# Patient Record
Sex: FEMALE | Race: WHITE | NOT HISPANIC OR LATINO | Employment: UNEMPLOYED | URBAN - METROPOLITAN AREA
[De-identification: names, ages, dates, MRNs, and addresses within clinical notes are randomized per-mention and may not be internally consistent; named-entity substitution may affect disease eponyms.]

---

## 2019-10-30 ENCOUNTER — HOSPITAL ENCOUNTER (INPATIENT)
Facility: HOSPITAL | Age: 44
LOS: 2 days | Discharge: HOME | DRG: 340 | End: 2019-11-02
Attending: EMERGENCY MEDICINE | Admitting: SURGERY
Payer: COMMERCIAL

## 2019-10-30 ENCOUNTER — APPOINTMENT (EMERGENCY)
Dept: CT IMAGING | Facility: HOSPITAL | Age: 44
DRG: 340 | End: 2019-10-30
Payer: COMMERCIAL

## 2019-10-30 DIAGNOSIS — K35.80 ACUTE APPENDICITIS: Primary | ICD-10-CM

## 2019-10-30 LAB
ALBUMIN SERPL BCP-MCNC: 4 G/DL (ref 3.5–5)
ALP SERPL-CCNC: 76 U/L (ref 46–116)
ALT SERPL W P-5'-P-CCNC: 25 U/L (ref 12–78)
ANION GAP SERPL CALCULATED.3IONS-SCNC: 13 MMOL/L (ref 4–13)
AST SERPL W P-5'-P-CCNC: 24 U/L (ref 5–45)
BASOPHILS # BLD AUTO: 0.04 THOUSANDS/ΜL (ref 0–0.1)
BASOPHILS NFR BLD AUTO: 0 % (ref 0–1)
BILIRUB SERPL-MCNC: 1.2 MG/DL (ref 0.2–1)
BUN SERPL-MCNC: 16 MG/DL (ref 5–25)
CALCIUM SERPL-MCNC: 8.9 MG/DL (ref 8.3–10.1)
CHLORIDE SERPL-SCNC: 101 MMOL/L (ref 100–108)
CO2 SERPL-SCNC: 21 MMOL/L (ref 21–32)
CREAT SERPL-MCNC: 0.86 MG/DL (ref 0.6–1.3)
EOSINOPHIL # BLD AUTO: 0.04 THOUSAND/ΜL (ref 0–0.61)
EOSINOPHIL NFR BLD AUTO: 0 % (ref 0–6)
ERYTHROCYTE [DISTWIDTH] IN BLOOD BY AUTOMATED COUNT: 12.4 % (ref 11.6–15.1)
GFR SERPL CREATININE-BSD FRML MDRD: 83 ML/MIN/1.73SQ M
GLUCOSE SERPL-MCNC: 121 MG/DL (ref 65–140)
HCG SERPL QL: NEGATIVE
HCT VFR BLD AUTO: 41.5 % (ref 34.8–46.1)
HGB BLD-MCNC: 14 G/DL (ref 11.5–15.4)
IMM GRANULOCYTES # BLD AUTO: 0.06 THOUSAND/UL (ref 0–0.2)
IMM GRANULOCYTES NFR BLD AUTO: 0 % (ref 0–2)
LIPASE SERPL-CCNC: 76 U/L (ref 73–393)
LYMPHOCYTES # BLD AUTO: 1.89 THOUSANDS/ΜL (ref 0.6–4.47)
LYMPHOCYTES NFR BLD AUTO: 12 % (ref 14–44)
MCH RBC QN AUTO: 30.8 PG (ref 26.8–34.3)
MCHC RBC AUTO-ENTMCNC: 33.7 G/DL (ref 31.4–37.4)
MCV RBC AUTO: 91 FL (ref 82–98)
MONOCYTES # BLD AUTO: 1 THOUSAND/ΜL (ref 0.17–1.22)
MONOCYTES NFR BLD AUTO: 6 % (ref 4–12)
NEUTROPHILS # BLD AUTO: 12.77 THOUSANDS/ΜL (ref 1.85–7.62)
NEUTS SEG NFR BLD AUTO: 82 % (ref 43–75)
NRBC BLD AUTO-RTO: 0 /100 WBCS
PLATELET # BLD AUTO: 274 THOUSANDS/UL (ref 149–390)
PMV BLD AUTO: 10.8 FL (ref 8.9–12.7)
POTASSIUM SERPL-SCNC: 4.1 MMOL/L (ref 3.5–5.3)
PROT SERPL-MCNC: 8.1 G/DL (ref 6.4–8.2)
RBC # BLD AUTO: 4.54 MILLION/UL (ref 3.81–5.12)
SODIUM SERPL-SCNC: 135 MMOL/L (ref 136–145)
WBC # BLD AUTO: 15.8 THOUSAND/UL (ref 4.31–10.16)

## 2019-10-30 PROCEDURE — 99285 EMERGENCY DEPT VISIT HI MDM: CPT

## 2019-10-30 PROCEDURE — 85025 COMPLETE CBC W/AUTO DIFF WBC: CPT | Performed by: EMERGENCY MEDICINE

## 2019-10-30 PROCEDURE — 96361 HYDRATE IV INFUSION ADD-ON: CPT

## 2019-10-30 PROCEDURE — 99285 EMERGENCY DEPT VISIT HI MDM: CPT | Performed by: EMERGENCY MEDICINE

## 2019-10-30 PROCEDURE — 0W9G40Z DRAINAGE OF PERITONEAL CAVITY WITH DRAINAGE DEVICE, PERCUTANEOUS ENDOSCOPIC APPROACH: ICD-10-PCS | Performed by: SURGERY

## 2019-10-30 PROCEDURE — 36415 COLL VENOUS BLD VENIPUNCTURE: CPT | Performed by: EMERGENCY MEDICINE

## 2019-10-30 PROCEDURE — 74177 CT ABD & PELVIS W/CONTRAST: CPT

## 2019-10-30 PROCEDURE — 83690 ASSAY OF LIPASE: CPT | Performed by: EMERGENCY MEDICINE

## 2019-10-30 PROCEDURE — 80053 COMPREHEN METABOLIC PANEL: CPT | Performed by: EMERGENCY MEDICINE

## 2019-10-30 PROCEDURE — 96375 TX/PRO/DX INJ NEW DRUG ADDON: CPT

## 2019-10-30 PROCEDURE — 0DTJ4ZZ RESECTION OF APPENDIX, PERCUTANEOUS ENDOSCOPIC APPROACH: ICD-10-PCS | Performed by: SURGERY

## 2019-10-30 PROCEDURE — 84703 CHORIONIC GONADOTROPIN ASSAY: CPT | Performed by: EMERGENCY MEDICINE

## 2019-10-30 RX ORDER — FENTANYL CITRATE 50 UG/ML
75 INJECTION, SOLUTION INTRAMUSCULAR; INTRAVENOUS ONCE
Status: COMPLETED | OUTPATIENT
Start: 2019-10-30 | End: 2019-10-30

## 2019-10-30 RX ORDER — KETOROLAC TROMETHAMINE 30 MG/ML
15 INJECTION, SOLUTION INTRAMUSCULAR; INTRAVENOUS ONCE
Status: COMPLETED | OUTPATIENT
Start: 2019-10-30 | End: 2019-10-30

## 2019-10-30 RX ORDER — ONDANSETRON 2 MG/ML
4 INJECTION INTRAMUSCULAR; INTRAVENOUS ONCE
Status: COMPLETED | OUTPATIENT
Start: 2019-10-30 | End: 2019-10-30

## 2019-10-30 RX ADMIN — FENTANYL CITRATE 75 MCG: 50 INJECTION, SOLUTION INTRAMUSCULAR; INTRAVENOUS at 22:02

## 2019-10-30 RX ADMIN — IOHEXOL 100 ML: 350 INJECTION, SOLUTION INTRAVENOUS at 23:10

## 2019-10-30 RX ADMIN — ONDANSETRON 4 MG: 2 INJECTION INTRAMUSCULAR; INTRAVENOUS at 22:02

## 2019-10-30 RX ADMIN — SODIUM CHLORIDE 1000 ML: 0.9 INJECTION, SOLUTION INTRAVENOUS at 22:03

## 2019-10-30 RX ADMIN — KETOROLAC TROMETHAMINE 15 MG: 30 INJECTION, SOLUTION INTRAMUSCULAR at 22:54

## 2019-10-31 ENCOUNTER — ANESTHESIA EVENT (EMERGENCY)
Dept: PERIOP | Facility: HOSPITAL | Age: 44
DRG: 340 | End: 2019-10-31
Payer: COMMERCIAL

## 2019-10-31 ENCOUNTER — ANESTHESIA (EMERGENCY)
Dept: PERIOP | Facility: HOSPITAL | Age: 44
DRG: 340 | End: 2019-10-31
Payer: COMMERCIAL

## 2019-10-31 PROBLEM — K35.80 ACUTE APPENDICITIS: Status: ACTIVE | Noted: 2019-10-30

## 2019-10-31 LAB
BILIRUB UR QL STRIP: NEGATIVE
CLARITY UR: CLEAR
COLOR UR: YELLOW
GLUCOSE UR STRIP-MCNC: NEGATIVE MG/DL
HGB UR QL STRIP.AUTO: NEGATIVE
KETONES UR STRIP-MCNC: ABNORMAL MG/DL
LEUKOCYTE ESTERASE UR QL STRIP: NEGATIVE
NITRITE UR QL STRIP: NEGATIVE
PH UR STRIP.AUTO: 7 [PH] (ref 4.5–8)
PROT UR STRIP-MCNC: NEGATIVE MG/DL
SP GR UR STRIP.AUTO: 1.01 (ref 1–1.03)
UROBILINOGEN UR QL STRIP.AUTO: 0.2 E.U./DL

## 2019-10-31 PROCEDURE — 96365 THER/PROPH/DIAG IV INF INIT: CPT

## 2019-10-31 PROCEDURE — 96375 TX/PRO/DX INJ NEW DRUG ADDON: CPT

## 2019-10-31 PROCEDURE — 96376 TX/PRO/DX INJ SAME DRUG ADON: CPT

## 2019-10-31 PROCEDURE — 88304 TISSUE EXAM BY PATHOLOGIST: CPT | Performed by: PATHOLOGY

## 2019-10-31 PROCEDURE — 96361 HYDRATE IV INFUSION ADD-ON: CPT

## 2019-10-31 PROCEDURE — 96372 THER/PROPH/DIAG INJ SC/IM: CPT

## 2019-10-31 PROCEDURE — 96368 THER/DIAG CONCURRENT INF: CPT

## 2019-10-31 PROCEDURE — 81003 URINALYSIS AUTO W/O SCOPE: CPT

## 2019-10-31 PROCEDURE — 44970 LAPAROSCOPY APPENDECTOMY: CPT | Performed by: SURGERY

## 2019-10-31 PROCEDURE — 96366 THER/PROPH/DIAG IV INF ADDON: CPT

## 2019-10-31 RX ORDER — ALBUTEROL SULFATE 90 UG/1
2 AEROSOL, METERED RESPIRATORY (INHALATION) AS NEEDED
COMMUNITY

## 2019-10-31 RX ORDER — SODIUM CHLORIDE 9 MG/ML
125 INJECTION, SOLUTION INTRAVENOUS CONTINUOUS
Status: DISCONTINUED | OUTPATIENT
Start: 2019-10-31 | End: 2019-11-01

## 2019-10-31 RX ORDER — HYDROMORPHONE HCL/PF 1 MG/ML
1 SYRINGE (ML) INJECTION
Status: DISCONTINUED | OUTPATIENT
Start: 2019-10-31 | End: 2019-11-01

## 2019-10-31 RX ORDER — SODIUM CHLORIDE 9 MG/ML
INJECTION, SOLUTION INTRAVENOUS AS NEEDED
Status: DISCONTINUED | OUTPATIENT
Start: 2019-10-31 | End: 2019-10-31 | Stop reason: HOSPADM

## 2019-10-31 RX ORDER — PROPOFOL 10 MG/ML
INJECTION, EMULSION INTRAVENOUS AS NEEDED
Status: DISCONTINUED | OUTPATIENT
Start: 2019-10-31 | End: 2019-10-31 | Stop reason: SURG

## 2019-10-31 RX ORDER — DEXAMETHASONE SODIUM PHOSPHATE 4 MG/ML
INJECTION, SOLUTION INTRA-ARTICULAR; INTRALESIONAL; INTRAMUSCULAR; INTRAVENOUS; SOFT TISSUE AS NEEDED
Status: DISCONTINUED | OUTPATIENT
Start: 2019-10-31 | End: 2019-10-31 | Stop reason: SURG

## 2019-10-31 RX ORDER — FENTANYL CITRATE/PF 50 MCG/ML
25 SYRINGE (ML) INJECTION
Status: DISCONTINUED | OUTPATIENT
Start: 2019-10-31 | End: 2019-10-31 | Stop reason: HOSPADM

## 2019-10-31 RX ORDER — PROMETHAZINE HYDROCHLORIDE 25 MG/ML
25 INJECTION, SOLUTION INTRAMUSCULAR; INTRAVENOUS ONCE AS NEEDED
Status: DISCONTINUED | OUTPATIENT
Start: 2019-10-31 | End: 2019-10-31 | Stop reason: HOSPADM

## 2019-10-31 RX ORDER — HYDROMORPHONE HCL/PF 1 MG/ML
0.4 SYRINGE (ML) INJECTION
Status: DISCONTINUED | OUTPATIENT
Start: 2019-10-31 | End: 2019-10-31 | Stop reason: HOSPADM

## 2019-10-31 RX ORDER — ONDANSETRON 2 MG/ML
4 INJECTION INTRAMUSCULAR; INTRAVENOUS ONCE AS NEEDED
Status: DISCONTINUED | OUTPATIENT
Start: 2019-10-31 | End: 2019-10-31 | Stop reason: HOSPADM

## 2019-10-31 RX ORDER — FENTANYL CITRATE 50 UG/ML
INJECTION, SOLUTION INTRAMUSCULAR; INTRAVENOUS AS NEEDED
Status: DISCONTINUED | OUTPATIENT
Start: 2019-10-31 | End: 2019-10-31 | Stop reason: SURG

## 2019-10-31 RX ORDER — ACETAMINOPHEN 325 MG/1
650 TABLET ORAL EVERY 6 HOURS PRN
Status: DISCONTINUED | OUTPATIENT
Start: 2019-10-31 | End: 2019-11-02 | Stop reason: HOSPADM

## 2019-10-31 RX ORDER — CEFAZOLIN SODIUM 2 G/50ML
2000 SOLUTION INTRAVENOUS EVERY 8 HOURS
Status: DISCONTINUED | OUTPATIENT
Start: 2019-10-31 | End: 2019-11-02 | Stop reason: HOSPADM

## 2019-10-31 RX ORDER — KETOROLAC TROMETHAMINE 30 MG/ML
15 INJECTION, SOLUTION INTRAMUSCULAR; INTRAVENOUS EVERY 6 HOURS PRN
Status: DISPENSED | OUTPATIENT
Start: 2019-10-31 | End: 2019-11-01

## 2019-10-31 RX ORDER — HYDROMORPHONE HCL/PF 1 MG/ML
1 SYRINGE (ML) INJECTION ONCE
Status: COMPLETED | OUTPATIENT
Start: 2019-10-31 | End: 2019-10-31

## 2019-10-31 RX ORDER — BUPIVACAINE HYDROCHLORIDE 2.5 MG/ML
INJECTION, SOLUTION INFILTRATION; PERINEURAL AS NEEDED
Status: DISCONTINUED | OUTPATIENT
Start: 2019-10-31 | End: 2019-10-31 | Stop reason: HOSPADM

## 2019-10-31 RX ORDER — OXYCODONE HYDROCHLORIDE 5 MG/1
5 TABLET ORAL EVERY 4 HOURS PRN
Status: DISCONTINUED | OUTPATIENT
Start: 2019-10-31 | End: 2019-11-01

## 2019-10-31 RX ORDER — ONDANSETRON 2 MG/ML
4 INJECTION INTRAMUSCULAR; INTRAVENOUS EVERY 6 HOURS PRN
Status: DISCONTINUED | OUTPATIENT
Start: 2019-10-31 | End: 2019-11-02 | Stop reason: HOSPADM

## 2019-10-31 RX ORDER — OXYCODONE HYDROCHLORIDE 10 MG/1
10 TABLET ORAL EVERY 4 HOURS PRN
Status: DISCONTINUED | OUTPATIENT
Start: 2019-10-31 | End: 2019-11-01

## 2019-10-31 RX ORDER — MONTELUKAST SODIUM 10 MG/1
10 TABLET ORAL
COMMUNITY

## 2019-10-31 RX ORDER — MAGNESIUM HYDROXIDE 1200 MG/15ML
LIQUID ORAL AS NEEDED
Status: DISCONTINUED | OUTPATIENT
Start: 2019-10-31 | End: 2019-10-31 | Stop reason: HOSPADM

## 2019-10-31 RX ORDER — ROCURONIUM BROMIDE 10 MG/ML
INJECTION, SOLUTION INTRAVENOUS AS NEEDED
Status: DISCONTINUED | OUTPATIENT
Start: 2019-10-31 | End: 2019-10-31 | Stop reason: SURG

## 2019-10-31 RX ORDER — METOCLOPRAMIDE HYDROCHLORIDE 5 MG/ML
10 INJECTION INTRAMUSCULAR; INTRAVENOUS ONCE AS NEEDED
Status: DISCONTINUED | OUTPATIENT
Start: 2019-10-31 | End: 2019-10-31 | Stop reason: HOSPADM

## 2019-10-31 RX ADMIN — OXYCODONE HYDROCHLORIDE 10 MG: 10 TABLET ORAL at 18:28

## 2019-10-31 RX ADMIN — OXYCODONE HYDROCHLORIDE 10 MG: 10 TABLET ORAL at 23:36

## 2019-10-31 RX ADMIN — FENTANYL CITRATE 50 MCG: 50 INJECTION INTRAMUSCULAR; INTRAVENOUS at 13:22

## 2019-10-31 RX ADMIN — HYDROMORPHONE HYDROCHLORIDE 1 MG: 1 INJECTION, SOLUTION INTRAMUSCULAR; INTRAVENOUS; SUBCUTANEOUS at 07:02

## 2019-10-31 RX ADMIN — CEFAZOLIN SODIUM 2000 MG: 2 SOLUTION INTRAVENOUS at 01:37

## 2019-10-31 RX ADMIN — METRONIDAZOLE 500 MG: 500 INJECTION, SOLUTION INTRAVENOUS at 18:42

## 2019-10-31 RX ADMIN — HYDROMORPHONE HYDROCHLORIDE 1 MG: 1 INJECTION, SOLUTION INTRAMUSCULAR; INTRAVENOUS; SUBCUTANEOUS at 01:40

## 2019-10-31 RX ADMIN — DEXAMETHASONE SODIUM PHOSPHATE 4 MG: 4 INJECTION, SOLUTION INTRAMUSCULAR; INTRAVENOUS at 13:37

## 2019-10-31 RX ADMIN — HYDROMORPHONE HYDROCHLORIDE 0.5 MG: 1 INJECTION, SOLUTION INTRAMUSCULAR; INTRAVENOUS; SUBCUTANEOUS at 13:48

## 2019-10-31 RX ADMIN — HYDROMORPHONE HYDROCHLORIDE 0.5 MG: 1 INJECTION, SOLUTION INTRAMUSCULAR; INTRAVENOUS; SUBCUTANEOUS at 13:43

## 2019-10-31 RX ADMIN — CEFAZOLIN SODIUM 2000 MG: 2 SOLUTION INTRAVENOUS at 20:28

## 2019-10-31 RX ADMIN — HYDROMORPHONE HYDROCHLORIDE 1 MG: 1 INJECTION, SOLUTION INTRAMUSCULAR; INTRAVENOUS; SUBCUTANEOUS at 18:40

## 2019-10-31 RX ADMIN — CEFAZOLIN SODIUM 2000 MG: 2 SOLUTION INTRAVENOUS at 09:34

## 2019-10-31 RX ADMIN — HYDROMORPHONE HYDROCHLORIDE 1 MG: 1 INJECTION, SOLUTION INTRAMUSCULAR; INTRAVENOUS; SUBCUTANEOUS at 09:40

## 2019-10-31 RX ADMIN — METRONIDAZOLE 500 MG: 500 INJECTION, SOLUTION INTRAVENOUS at 01:37

## 2019-10-31 RX ADMIN — PHENYLEPHRINE HYDROCHLORIDE 100 MCG: 10 INJECTION INTRAVENOUS at 13:29

## 2019-10-31 RX ADMIN — METRONIDAZOLE 500 MG: 500 INJECTION, SOLUTION INTRAVENOUS at 10:35

## 2019-10-31 RX ADMIN — FENTANYL CITRATE 25 MCG: 50 INJECTION, SOLUTION INTRAMUSCULAR; INTRAVENOUS at 15:31

## 2019-10-31 RX ADMIN — SODIUM CHLORIDE 125 ML/HR: 0.9 INJECTION, SOLUTION INTRAVENOUS at 03:35

## 2019-10-31 RX ADMIN — FENTANYL CITRATE 25 MCG: 50 INJECTION, SOLUTION INTRAMUSCULAR; INTRAVENOUS at 15:19

## 2019-10-31 RX ADMIN — SODIUM CHLORIDE 125 ML/HR: 0.9 INJECTION, SOLUTION INTRAVENOUS at 10:42

## 2019-10-31 RX ADMIN — FENTANYL CITRATE 150 MCG: 50 INJECTION INTRAMUSCULAR; INTRAVENOUS at 13:29

## 2019-10-31 RX ADMIN — ONDANSETRON 4 MG: 2 INJECTION INTRAMUSCULAR; INTRAVENOUS at 14:09

## 2019-10-31 RX ADMIN — CEFAZOLIN SODIUM 2000 MG: 2 SOLUTION INTRAVENOUS at 13:44

## 2019-10-31 RX ADMIN — PROPOFOL 200 MG: 10 INJECTION, EMULSION INTRAVENOUS at 13:29

## 2019-10-31 RX ADMIN — ENOXAPARIN SODIUM 40 MG: 40 INJECTION SUBCUTANEOUS at 09:30

## 2019-10-31 RX ADMIN — ROCURONIUM BROMIDE 50 MG: 10 SOLUTION INTRAVENOUS at 13:29

## 2019-10-31 NOTE — ANESTHESIA POSTPROCEDURE EVALUATION
Post-Op Assessment Note    CV Status:  Stable  Pain Score: 0    Pain management: adequate     Mental Status:  Alert   Hydration Status:  Stable   PONV Controlled:  None   Airway Patency:  Patent   Post Op Vitals Reviewed: Yes      Staff: Anesthesiologist           BP   117/58   Temp  99   Pulse 100   Resp   11   SpO2   100

## 2019-10-31 NOTE — DISCHARGE INSTRUCTIONS
Diet and activity as tolerated  May shower  May drive when not taking pain medications  Please call [phone] for a follow-up office visit for 2 weeks  [address] [facility], [address], [place], [address] [facility], [address]    Between [facility] and [facility]     Please consider milk a magnesia as needed in order to help prevent constipation

## 2019-10-31 NOTE — ED NOTES
Patient aware urine sample is needed, Patient does not have to go at this time       April Tressa Schwartz RN  10/30/19 0661

## 2019-10-31 NOTE — ANESTHESIA PREPROCEDURE EVALUATION
Review of Systems/Medical History  Patient summary reviewed  Chart reviewed      Cardiovascular  Exercise tolerance (METS): >4,     Pulmonary  Asthma ,        GI/Hepatic      Comment: Appendicitis     Negative  ROS        Endo/Other  Negative endo/other ROS      GYN  Negative gynecology ROS          Hematology  Negative hematology ROS      Musculoskeletal    Comment: Left sided alejandrina facial spasms for which she receives botox      Neurology  Negative neurology ROS      Psychology   Negative psychology ROS              Physical Exam    Airway    Mallampati score: III  TM Distance: >3 FB  Neck ROM: full     Dental   No notable dental hx     Cardiovascular  Cardiovascular exam normal    Pulmonary  Pulmonary exam normal     Other Findings        Anesthesia Plan  ASA Score- 2     Anesthesia Type- general with ASA Monitors  Additional Monitors:   Airway Plan: ETT  Plan Factors-    Induction- intravenous  Postoperative Plan-     Informed Consent- Anesthetic plan and risks discussed with patient

## 2019-10-31 NOTE — H&P
H&P Exam - General Surgery   Louvella Blizzard 38 y o  female MRN: [id number]  Unit/Bed#: ED 16 Encounter: [id number]    Assessment/Plan     Assessment/Plan:  38 y o  yo female with acute appendicitis    MAINOR  [email or web address]  Ancef/flagyl  To OR today for laparoscopic appendectomy  DVT ppx    History of Present Illness     HPI:  Louvella Blizzard is a 38 y o  female who presents with acute onset abdominal pain starting 1 day prior  Patient denies fevers and chills at home  Pain is located in the right lower quadrant and is nonradiating  Patient reports that pain was relieved when she arrived to the emergency department, but is starting to bother her now  She denies any past surgeries  Review of Systems   Constitutional: Negative  HENT: Negative  Eyes: Negative  Respiratory: Negative  Cardiovascular: Negative  Gastrointestinal: Positive for abdominal pain  Negative for nausea and vomiting  Endocrine: Negative  Genitourinary: Negative  Musculoskeletal: Negative  Skin: Negative  Allergic/Immunologic: Negative  Neurological: Negative  Hematological: Negative  Psychiatric/Behavioral: Negative  Historical Information   Past Medical History:   Diagnosis Date    Asthma      History reviewed  No pertinent surgical history  Social History   Social History     Substance and Sexual Activity   Alcohol Use Yes    Comment: Social     Social History     Substance and Sexual Activity   Drug Use Never     Social History     Tobacco Use   Smoking Status Never Smoker   Smokeless Tobacco Never Used     Family History: History reviewed  No pertinent family history  Meds/Allergies   PTA meds:   Prior to Admission Medications   Prescriptions Last Dose Informant Patient Reported? Taking?    Beclomethasone Dipropionate (QVAR IN) 10/30/2019 at Unknown time  Yes Yes   Sig: Inhale 2 (two) times a day   albuterol (PROVENTIL HFA,VENTOLIN HFA) 90 mcg/act inhaler Past Week at Unknown time  Yes Yes Sig: Inhale 2 puffs as needed for wheezing   montelukast (SINGULAIR) 10 mg tablet Past Week at Unknown time  Yes Yes   Sig: Take 10 mg by mouth daily at bedtime      Facility-Administered Medications: None     No Known Allergies    Objective   First Vitals:   Blood Pressure: 120/82 (10/30/19 2200)  Pulse: 85 (10/30/19 2143)  Temperature: 97 7 °F (36 5 °C) (10/30/19 2143)  Temp Source: Oral (10/30/19 2143)  Respirations: 20 (10/30/19 2143)  Height: 5' 6" (167 6 cm) (10/31/19 0010)  Weight - Scale: 81 1 kg (178 lb 12 7 oz) (10/30/19 2143)  SpO2: 100 % (10/30/19 2200)    Current Vitals:   Blood Pressure: 106/60 (10/31/19 0100)  Pulse: (!) 114 (10/31/19 0100)  Temperature: 97 7 °F (36 5 °C) (10/30/19 2143)  Temp Source: Oral (10/30/19 2143)  Respirations: 16 (10/31/19 0100)  Height: 5' 6" (167 6 cm) (10/31/19 0010)  Weight - Scale: 81 1 kg (178 lb 12 7 oz) (10/30/19 2143)  SpO2: 97 % (10/31/19 0100)      Intake/Output Summary (Last 24 hours) at 10/31/2019 0656  Last data filed at 10/31/2019 0340  Gross per 24 hour   Intake 1150 ml   Output 100 ml   Net 1050 ml       Invasive Devices     Peripheral Intravenous Line            Peripheral IV 10/30/19 Right Antecubital less than 1 day                Physical Exam   Constitutional: She is oriented to person, place, and time  She appears well-developed and well-nourished  HENT:   Head: Normocephalic and atraumatic  Eyes: Pupils are equal, round, and reactive to light  Neck: Normal range of motion  Cardiovascular: Normal rate and regular rhythm  Pulmonary/Chest: Effort normal    Abdominal: Soft  Musculoskeletal: Normal range of motion  Neurological: She is alert and oriented to person, place, and time  Skin: Skin is warm and dry  Psychiatric: She has a normal mood and affect   Her behavior is normal        Lab Results:   CBC:   Lab Results   Component Value Date    WBC 15 80 (H) 10/30/2019    HGB 14 0 10/30/2019    HCT 41 5 10/30/2019    MCV 91 10/30/2019  10/30/2019    MCH 30 8 10/30/2019    MCHC 33 7 10/30/2019    RDW 12 4 10/30/2019    MPV 10 8 10/30/2019    NRBC 0 10/30/2019   , CMP:   Lab Results   Component Value Date    SODIUM 135 (L) 10/30/2019    K 4 1 10/30/2019     10/30/2019    CO2 21 10/30/2019    BUN 16 10/30/2019    CREATININE 0 86 10/30/2019    CALCIUM 8 9 10/30/2019    AST 24 10/30/2019    ALT 25 10/30/2019    ALKPHOS 76 10/30/2019    EGFR 83 10/30/2019   , Coagulation: No results found for: PT, INR, APTT  Imaging: I have personally reviewed pertinent reports  and I have personally reviewed pertinent films in PACS  EKG, Pathology, and Other Studies: I have personally reviewed pertinent reports  and I have personally reviewed pertinent films in PACS    Code Status: Level 1 - Full Code  Advance Directive and Living Will:      Power of :    POLST:      Counseling / Coordination of Care  Total floor / unit time spent today 20 minutes  Greater than 50% of total time was spent with the patient and / or family counseling and / or coordination of care

## 2019-10-31 NOTE — OP NOTE
OPERATIVE REPORT  PATIENT NAME: Darius Kim    :  1975  MRN: [id number]  Pt Location: AN OR ROOM 02    SURGERY DATE: 10/31/2019    Surgeon(s) and Role:     * Brendan Cuba MD - Primary     * Chay Desouza - Assisting    Preop Diagnosis:  Acute appendicitis [K35 80]    Post-Op Diagnosis Codes:     * Acute appendicitis [K35 80] with perforation and abscess    Procedure(s) (LRB):  APPENDECTOMY LAPAROSCOPIC (N/A), drainage of abscess, placement of drain    Specimen(s):  ID Type Source Tests Collected by Time Destination   1 : CC PCP Carloyn Nyhan, DO Tissue Appendix TISSUE EXAM Brendan Cuba MD 10/31/2019 1403        Estimated Blood Loss:   Minimal    Drains:  Closed/Suction Drain Other (Comment) RLQ Bulb 10 Fr  (Active)   Site Description Unable to view 10/31/2019  2:39 PM   Dressing Status Intact;Dry 10/31/2019  2:39 PM   Drainage Appearance Bloody 10/31/2019  2:39 PM   Status To bulb suction 10/31/2019  2:39 PM   Number of days: 0       Anesthesia Type:   General    Operative Indications:  Acute appendicitis [K35 80]      Operative Findings:  Independent, nonsmoker, ASA 1, wound class 4, BMI 29, weight 180, height 66    Cardiovascular  Exercise tolerance (METS): >4,      Pulmonary  Asthma ,          GI/Hepatic        Comment: Appendicitis      Negative  ROS          Endo/Other  Negative endo/other ROS        GYN  Negative gynecology ROS             Hematology  Negative hematology ROS        Musculoskeletal     Comment: Left sided alejandrina facial spasms for which she receives botox       Neurology  Negative neurology ROS        Psychology   Negative psychology ROS          Complications:   None    Procedure and Technique  Patient was identified visually and via armband  Placed in the supine position  After general anesthesia the abdomen was prepped draped in a sterile fashion  0 25% Marcaine with epinephrine was utilized throughout the procedure  Incision was made at the umbilicus    This carried down through skin subcutaneous tissue  A direct vision a 12 mm trocar was then placed under direct patient  CO2 was then insufflated back pressure 15  Two additional midline ports were then placed after Marcaine instillation under direct vision  The there was separation noted in the right lower quadrant the abdomen  There was flexible particulate stool matter present the appendix was noted of a be aimed into the pelvis and flow being tube region  And breaking up adhesions of was noted to have free floating stool  Further adhesiolysis into the pelvis revealed an abscess behind the uterus  Separation was infection was aspirated white initially  Fecaliths were aspirated away by op sizing are aspirating instrumentation  The mesoappendix was then divided using Harmonic scalpel  This carried down level the appendiceal base  This was then divided then using Harmonic scalpel  Was placed Endo-Catch bag  At this point the abdomen and pelvis was then irrigated with the the additional irrigation  A JAYMIE drain was then placed here in the epigastric region port down to the right lower quadrant and pelvis  This was affixed to the skin  Fascia was then closed 0 Vicryl suture followed by 4 Monocryl suture and Dermabond  The patient tolerated this procedure well  Sponge instrument count correct x2       I was present for the entire procedure    Patient Disposition:  PACU     SIGNATURE: Lia Holly MD  DATE: October 31, 2019  TIME: 3:42 PM

## 2019-10-31 NOTE — ED PROVIDER NOTES
History  Chief Complaint   Patient presents with    Abdominal Pain Re-Eval     Pt here for evaluation of severe right lower abd pain that starting at 3am and progressively became worse throughout the day  +vomiting  History provided by:  Patient   used: No    67-year-old female with a history of asthma only presented for evaluation of worsening abdominal pain over the last 18 hours  States that it woke her from sleep around the periumbilical/epigastric region at 3:00 a m  And has persisted throughout the day, moving to the right lower quadrant and worsening in intensity  Currently it is severe, localized without radiation  Has associated nausea with some vomiting  No diarrhea, urinary complaints  LMP about 10/10  No abdominal surgeries  She appears very uncomfortable on exam and has significant tenderness in the right lower quadrant with guarding and some rebound  Acute appendicitis is likely  Plan labs, CT, pain control and re-evaluate  None       Past Medical History:   Diagnosis Date    Asthma        History reviewed  No pertinent surgical history  History reviewed  No pertinent family history  I have reviewed and agree with the history as documented  Social History     Tobacco Use    Smoking status: Never Smoker    Smokeless tobacco: Never Used   Substance Use Topics    Alcohol use: Yes     Comment: Social    Drug use: Never        Review of Systems   Constitutional: Positive for appetite change  Negative for activity change, fatigue and fever  Respiratory: Negative for chest tightness and shortness of breath  Gastrointestinal: Positive for abdominal pain, nausea and vomiting  Musculoskeletal: Negative for back pain and neck pain  Neurological: Negative for dizziness and weakness  All other systems reviewed and are negative  Physical Exam  Physical Exam   Constitutional: She appears well-developed and well-nourished  She appears distressed  HENT:   Head: Normocephalic  Mouth/Throat: Oropharynx is clear and moist    Cardiovascular: Normal rate and regular rhythm  Pulmonary/Chest: Effort normal  No respiratory distress  Abdominal: Soft  She exhibits no mass  Significant right lower quadrant tenderness with rebound, guarding  Musculoskeletal: Normal range of motion  She exhibits no edema  Skin: Skin is warm and dry  No rash noted  Psychiatric: She has a normal mood and affect  Her behavior is normal    Nursing note and vitals reviewed        Vital Signs  ED Triage Vitals   Temperature Pulse Respirations Blood Pressure SpO2   10/30/19 2143 10/30/19 2143 10/30/19 2143 10/30/19 2200 10/30/19 2200   97 7 °F (36 5 °C) 85 20 120/82 100 %      Temp Source Heart Rate Source Patient Position - Orthostatic VS BP Location FiO2 (%)   10/30/19 2143 10/30/19 2143 10/31/19 0010 10/30/19 2143 --   Oral Monitor Lying Right arm       Pain Score       10/30/19 2143       Worst Possible Pain           Vitals:    10/30/19 2143 10/30/19 2200 10/31/19 0010 10/31/19 0015   BP:  120/82 102/59 102/59   Pulse: 85 96 (!) 114 (!) 114   Patient Position - Orthostatic VS:   Lying          Visual Acuity      ED Medications  Medications   ondansetron (ZOFRAN) injection 4 mg (has no administration in time range)   enoxaparin (LOVENOX) subcutaneous injection 40 mg (has no administration in time range)   sodium chloride 0 9 % infusion (has no administration in time range)   metroNIDAZOLE (FLAGYL) IVPB (premix) 500 mg (has no administration in time range)   ceFAZolin (ANCEF) IVPB (premix) 2,000 mg (has no administration in time range)   acetaminophen (TYLENOL) tablet 650 mg (has no administration in time range)   HYDROmorphone (DILAUDID) injection 1 mg (has no administration in time range)   oxyCODONE (ROXICODONE) immediate release tablet 10 mg (has no administration in time range)   oxyCODONE (ROXICODONE) IR tablet 5 mg (has no administration in time range)   HYDROmorphone (DILAUDID) injection 1 mg (has no administration in time range)   sodium chloride 0 9 % bolus 1,000 mL (1,000 mL Intravenous New Bag 10/30/19 2203)   ondansetron (ZOFRAN) injection 4 mg (4 mg Intravenous Given 10/30/19 2202)   ketorolac (TORADOL) injection 15 mg (15 mg Intravenous Given 10/30/19 2254)   fentanyl citrate (PF) 100 MCG/2ML 75 mcg (75 mcg Intravenous Given 10/30/19 2202)   iohexol (OMNIPAQUE) 350 MG/ML injection (MULTI-DOSE) 100 mL (100 mL Intravenous Given 10/30/19 2310)       Diagnostic Studies  Results Reviewed     Procedure Component Value Units Date/Time    ED Urine Macroscopic [[id number]]  (Abnormal) Collected:  10/31/19 0030    Lab Status:  Final result Specimen:  Urine Updated:  10/31/19 0013     Color, UA Yellow     Clarity, UA Clear     pH, UA 7 0     Leukocytes, UA Negative     Nitrite, UA Negative     Protein, UA Negative mg/dl      Glucose, UA Negative mg/dl      Ketones, UA 15 (1+) mg/dl      Urobilinogen, UA 0 2 E U /dl      Bilirubin, UA Negative     Blood, UA Negative     Specific Gravity, UA 1 010    Narrative:       CLINITEK RESULT    hCG, qualitative pregnancy [[id number]]  (Normal) Collected:  10/30/19 2205    Lab Status:  Final result Specimen:  Blood from Arm, Right Updated:  10/30/19 2251     Preg, Serum Negative    Lipase [[id number]]  (Normal) Collected:  10/30/19 2205    Lab Status:  Final result Specimen:  Blood from Arm, Right Updated:  10/30/19 2231     Lipase 76 u/L     Comprehensive metabolic panel [[id number]]  (Abnormal) Collected:  10/30/19 2205    Lab Status:  Final result Specimen:  Blood from Arm, Right Updated:  10/30/19 2231     Sodium 135 mmol/L      Potassium 4 1 mmol/L      Chloride 101 mmol/L      CO2 21 mmol/L      ANION GAP 13 mmol/L      BUN 16 mg/dL      Creatinine 0 86 mg/dL      Glucose 121 mg/dL      Calcium 8 9 mg/dL      AST 24 U/L      ALT 25 U/L      Alkaline Phosphatase 76 U/L      Total Protein 8 1 g/dL      Albumin 4 0 g/dL      Total Bilirubin 1 20 mg/dL      eGFR 83 ml/min/1 73sq m     Narrative:       [place] guidelines for Chronic Kidney Disease (CKD):     Stage 1 with normal or high GFR (GFR > 90 mL/min/1 73 square meters)    Stage 2 Mild CKD (GFR = 60-89 mL/min/1 73 square meters)    Stage 3A Moderate CKD (GFR = 45-59 mL/min/1 73 square meters)    Stage 3B Moderate CKD (GFR = 30-44 mL/min/1 73 square meters)    Stage 4 Severe CKD (GFR = 15-29 mL/min/1 73 square meters)    Stage 5 End Stage CKD (GFR <15 mL/min/1 73 square meters)  Note: GFR calculation is accurate only with a steady state creatinine    CBC and differential [[id number]]  (Abnormal) Collected:  10/30/19 2205    Lab Status:  Final result Specimen:  Blood from Arm, Right Updated:  10/30/19 2213     WBC 15 80 Thousand/uL      RBC 4 54 Million/uL      Hemoglobin 14 0 g/dL      Hematocrit 41 5 %      MCV 91 fL      MCH 30 8 pg      MCHC 33 7 g/dL      RDW 12 4 %      MPV 10 8 fL      Platelets 662 Thousands/uL      nRBC 0 /100 WBCs      Neutrophils Relative 82 %      Immat GRANS % 0 %      Lymphocytes Relative 12 %      Monocytes Relative 6 %      Eosinophils Relative 0 %      Basophils Relative 0 %      Neutrophils Absolute 12 77 Thousands/µL      Immature Grans Absolute 0 06 Thousand/uL      Lymphocytes Absolute 1 89 Thousands/µL      Monocytes Absolute 1 00 Thousand/µL      Eosinophils Absolute 0 04 Thousand/µL      Basophils Absolute 0 04 Thousands/µL                  CT abdomen pelvis with contrast   Final Result by Jose L Douglas MD (10/31 0018)      Acute appendicitis, as described above  Please see discussion  Surgical consultation is recommended               I personally discussed this study with Sharonda Ramirez on 10/31/2019 at 12:15 AM                      Workstation performed: [id number]                    Procedures  Procedures       ED Course  ED Course as of Oct 31 0031   Wed Oct 30, 2019   2342 Patient still having pain but significantly improved since arrival   Awaiting CT read  MDM  Number of Diagnoses or Management Options  Acute appendicitis: new and requires workup  Diagnosis management comments: 60-year-old otherwise healthy female presented with worsening abdominal pain initially in the periumbilical region and moving to the right lower quadrant  Very tender on exam   CT consistent with acute appendicitis, possible rupture with free fluid in the pelvis  Surgical team was consulted  Patient admitted to surgical service for antibiotics, appendectomy  Patient stable at this time  Amount and/or Complexity of Data Reviewed  Clinical lab tests: ordered and reviewed  Tests in the radiology section of CPT®: reviewed and ordered  Discuss the patient with other providers: yes    Patient Progress  Patient progress: improved      Disposition  Final diagnoses:   Acute appendicitis     Time reflects when diagnosis was documented in both MDM as applicable and the Disposition within this note     Time User Action Codes Description Comment    10/31/2019 12:19 AM Jasvir Perez [K35 80] Acute appendicitis       ED Disposition     ED Disposition Condition Date/Time Comment    Admit Stable Thu Oct 31, 2019 12:26 AM Case was discussed with Frederick Troy and the patient's admission status was agreed to be Admission Status: observation status to the service of Dr Eric Torres  Follow-up Information    None         Patient's Medications    No medications on file     No discharge procedures on file      ED Provider  Electronically Signed by           Ekaterina Little MD  10/31/19 3002

## 2019-11-01 LAB
ANION GAP SERPL CALCULATED.3IONS-SCNC: 8 MMOL/L (ref 4–13)
BUN SERPL-MCNC: 14 MG/DL (ref 5–25)
CALCIUM SERPL-MCNC: 7.6 MG/DL (ref 8.3–10.1)
CHLORIDE SERPL-SCNC: 103 MMOL/L (ref 100–108)
CO2 SERPL-SCNC: 24 MMOL/L (ref 21–32)
CREAT SERPL-MCNC: 0.85 MG/DL (ref 0.6–1.3)
GFR SERPL CREATININE-BSD FRML MDRD: 84 ML/MIN/1.73SQ M
GLUCOSE SERPL-MCNC: 133 MG/DL (ref 65–140)
MAGNESIUM SERPL-MCNC: 1.8 MG/DL (ref 1.6–2.6)
POTASSIUM SERPL-SCNC: 3.2 MMOL/L (ref 3.5–5.3)
SODIUM SERPL-SCNC: 135 MMOL/L (ref 136–145)

## 2019-11-01 PROCEDURE — 99232 SBSQ HOSP IP/OBS MODERATE 35: CPT | Performed by: NURSE PRACTITIONER

## 2019-11-01 PROCEDURE — 83735 ASSAY OF MAGNESIUM: CPT | Performed by: SURGERY

## 2019-11-01 PROCEDURE — 80048 BASIC METABOLIC PNL TOTAL CA: CPT | Performed by: SURGERY

## 2019-11-01 RX ORDER — OXYCODONE HYDROCHLORIDE AND ACETAMINOPHEN 5; 325 MG/1; MG/1
1 TABLET ORAL EVERY 4 HOURS PRN
Qty: 20 TABLET | Refills: 0 | Status: CANCELLED | OUTPATIENT
Start: 2019-11-01 | End: 2019-11-11

## 2019-11-01 RX ORDER — HYDROMORPHONE HCL/PF 1 MG/ML
0.2 SYRINGE (ML) INJECTION
Status: DISCONTINUED | OUTPATIENT
Start: 2019-11-01 | End: 2019-11-02 | Stop reason: HOSPADM

## 2019-11-01 RX ORDER — OXYCODONE HYDROCHLORIDE 5 MG/1
5 TABLET ORAL EVERY 4 HOURS PRN
Status: DISCONTINUED | OUTPATIENT
Start: 2019-11-01 | End: 2019-11-02 | Stop reason: HOSPADM

## 2019-11-01 RX ORDER — OXYCODONE HYDROCHLORIDE 10 MG/1
10 TABLET ORAL EVERY 4 HOURS PRN
Status: DISCONTINUED | OUTPATIENT
Start: 2019-11-01 | End: 2019-11-02 | Stop reason: HOSPADM

## 2019-11-01 RX ORDER — POTASSIUM CHLORIDE, DEXTROSE MONOHYDRATE AND SODIUM CHLORIDE 300; 5; 900 MG/100ML; G/100ML; MG/100ML
100 INJECTION, SOLUTION INTRAVENOUS CONTINUOUS
Status: DISCONTINUED | OUTPATIENT
Start: 2019-11-01 | End: 2019-11-02

## 2019-11-01 RX ORDER — AMOXICILLIN AND CLAVULANATE POTASSIUM 875; 125 MG/1; MG/1
1 TABLET, FILM COATED ORAL EVERY 12 HOURS SCHEDULED
Qty: 14 TABLET | Refills: 0 | Status: CANCELLED | OUTPATIENT
Start: 2019-11-01 | End: 2019-11-08

## 2019-11-01 RX ORDER — PROCHLORPERAZINE MALEATE 10 MG
5 TABLET ORAL EVERY 6 HOURS PRN
Status: DISCONTINUED | OUTPATIENT
Start: 2019-11-01 | End: 2019-11-02 | Stop reason: HOSPADM

## 2019-11-01 RX ADMIN — METRONIDAZOLE 500 MG: 500 INJECTION, SOLUTION INTRAVENOUS at 01:06

## 2019-11-01 RX ADMIN — METRONIDAZOLE 500 MG: 500 INJECTION, SOLUTION INTRAVENOUS at 09:32

## 2019-11-01 RX ADMIN — METRONIDAZOLE 500 MG: 500 INJECTION, SOLUTION INTRAVENOUS at 18:05

## 2019-11-01 RX ADMIN — POTASSIUM CHLORIDE, DEXTROSE MONOHYDRATE AND SODIUM CHLORIDE 100 ML/HR: 300; 5; 900 INJECTION, SOLUTION INTRAVENOUS at 13:35

## 2019-11-01 RX ADMIN — ENOXAPARIN SODIUM 40 MG: 40 INJECTION SUBCUTANEOUS at 09:32

## 2019-11-01 RX ADMIN — ACETAMINOPHEN 650 MG: 325 TABLET, FILM COATED ORAL at 19:27

## 2019-11-01 RX ADMIN — OXYCODONE HYDROCHLORIDE 10 MG: 10 TABLET ORAL at 04:23

## 2019-11-01 RX ADMIN — ACETAMINOPHEN 650 MG: 325 TABLET, FILM COATED ORAL at 12:04

## 2019-11-01 RX ADMIN — KETOROLAC TROMETHAMINE 15 MG: 30 INJECTION, SOLUTION INTRAMUSCULAR at 14:52

## 2019-11-01 RX ADMIN — PROCHLORPERAZINE MALEATE 5 MG: 10 TABLET ORAL at 11:46

## 2019-11-01 RX ADMIN — SODIUM CHLORIDE 125 ML/HR: 0.9 INJECTION, SOLUTION INTRAVENOUS at 09:36

## 2019-11-01 RX ADMIN — HYDROMORPHONE HYDROCHLORIDE 1 MG: 1 INJECTION, SOLUTION INTRAMUSCULAR; INTRAVENOUS; SUBCUTANEOUS at 07:26

## 2019-11-01 RX ADMIN — SODIUM CHLORIDE 125 ML/HR: 0.9 INJECTION, SOLUTION INTRAVENOUS at 01:05

## 2019-11-01 RX ADMIN — CEFAZOLIN SODIUM 2000 MG: 2 SOLUTION INTRAVENOUS at 20:42

## 2019-11-01 RX ADMIN — CEFAZOLIN SODIUM 2000 MG: 2 SOLUTION INTRAVENOUS at 13:35

## 2019-11-01 RX ADMIN — ONDANSETRON 4 MG: 2 INJECTION INTRAMUSCULAR; INTRAVENOUS at 07:29

## 2019-11-01 RX ADMIN — CEFAZOLIN SODIUM 2000 MG: 2 SOLUTION INTRAVENOUS at 04:17

## 2019-11-01 NOTE — UTILIZATION REVIEW
Initial Clinical Review   10/31/2019  0024 OUTPATIENT NO CHARGE BED and CHANGED 10/31/2019 1713 INPATIENT RE:  Patient post appendectomy with findings of perforation and abscess requiring IV antibiotics  Admission: Date/Time/Statement: Inpatient Admission Orders (From admission, onward)     Ordered        10/31/19 1713  Inpatient Admission  Once                   Orders Placed This Encounter   Procedures    Inpatient Admission     Standing Status:   Standing     Number of Occurrences:   1     Order Specific Question:   Admitting Physician     Answer:   Kathryn Carey [[id number]]     Order Specific Question:   Level of Care     Answer:   Med Surg [[id number]]     Order Specific Question:   Estimated length of stay     Answer:   More than 2 Midnights     Order Specific Question:   Certification     Answer:   I certify that inpatient services are medically necessary for this patient for a duration of greater than two midnights  See H&P and MD Progress Notes for additional information about the patient's course of treatment  ED Arrival Information     Expected Arrival Acuity Means of Arrival Escorted By Service Admission Type    - 10/30/2019 21:30 Urgent Wheelchair Spouse Surgery-General Urgent    Arrival Complaint    ADOBIMAL PAIN        Chief Complaint   Patient presents with    Abdominal Pain Re-Eval     Pt here for evaluation of severe right lower abd pain that starting at 3am and progressively became worse throughout the day  +vomiting  Assessment/Plan: This is a 38year old female from home to ED admitted to inpatient due to acute appendicitis with perforation and abscess  Presented due to worsening abdominal pain with associated nausea and vomiting over the last 18 hours  On exam has significant right lower quadrant tenderness with rebound and guarding  WBC 15 80  Ct abdomen showed acute appendicitis  Pain control, IV antibiotics and IVF in progress   To surgery    10/31/2019 Procedure - APPENDECTOMY LAPAROSCOPIC (N/A), drainage of abscess, placement of drain  Findings- The there was separation noted in the right lower quadrant the abdomen  There was flexible particulate stool matter present the appendix was noted of a be aimed into the pelvis and flow being tube region  And breaking up adhesions of was noted to have free floating stool  Further adhesiolysis into the pelvis revealed an abscess behind the uterus  On 11/1/20109- patient  Is post op day one laparoscopic appendectomy for acute appendicitis with perforation and abscess  JAYMIE drain 30 ml    IV antibiotics continue  Having abdominal pain requiring IV analgesia  having nausea and IV antiemetics in use, IVF continue        ED Triage Vitals   Temperature Pulse Respirations Blood Pressure SpO2   10/30/19 2143 10/30/19 2143 10/30/19 2143 10/30/19 2200 10/30/19 2200   97 7 °F (36 5 °C) 85 20 120/82 100 %      Temp Source Heart Rate Source Patient Position - Orthostatic VS BP Location FiO2 (%)   10/30/19 2143 10/30/19 2143 10/31/19 0010 10/30/19 2143 --   Oral Monitor Lying Right arm       Pain Score       10/30/19 2143       Worst Possible Pain        Wt Readings from Last 1 Encounters:   10/30/19 81 1 kg (178 lb 12 7 oz)     Additional Vital Signs:   11/01/19 0700  97 7 °F (36 5 °C)  80  16  107/60  77  96 %  None (Room air)    Sitting   11/01/19 0300  97 7 °F (36 5 °C)  76  16  93/58  70  96 %  None (Room air)    Lying   10/31/19 2119  98 1 °F (36 7 °C)  85  18  98/56    95 %  None (Room air)    Lying   10/31/19 2012  97 8 °F (36 6 °C)  99  19  94/51    94 %  None (Room air)    Lying   10/31/19 1900  98 9 °F (37 2 °C)  99  18  104/65    93 %  None (Room air)    Lying   10/31/19 1600    86  16  99/54    97 %  Nasal cannula  WDL     10/31/19 1439  99 1 °F (37 3 °C)  108Abnormal   16  117/58    100 %  Nasal cannula  X     10/31/19 1201  97 3 °F (36 3 °C)Abnormal   108Abnormal   20  109/58    97 %  None (Room air)       10/31/19 1144    110Abnormal 18  108/59    98 %  None (Room air)       10/31/19 0930    110Abnormal   18  102/57    98 %  None (Room air)       10/31/19 0700  99 °F (37 2 °C)  105  20  95/51    97 %  None (Room air)           Pertinent Labs/Diagnostic Test Results:   10/31/2019 CT abdomen - Acute appendicitis  Results from last 7 days   Lab Units 10/30/19  2205   WBC Thousand/uL 15 80*   HEMOGLOBIN g/dL 14 0   HEMATOCRIT % 41 5   PLATELETS Thousands/uL 274   NEUTROS ABS Thousands/µL 12 77*         Results from last 7 days   Lab Units 10/30/19  2205   SODIUM mmol/L 135*   POTASSIUM mmol/L 4 1   CHLORIDE mmol/L 101   CO2 mmol/L 21   ANION GAP mmol/L 13   BUN mg/dL 16   CREATININE mg/dL 0 86   EGFR ml/min/1 73sq m 83   CALCIUM mg/dL 8 9     Results from last 7 days   Lab Units 10/30/19  2205   AST U/L 24   ALT U/L 25   ALK PHOS U/L 76   TOTAL PROTEIN g/dL 8 1   ALBUMIN g/dL 4 0   TOTAL BILIRUBIN mg/dL 1 20*     Results from last 7 days   Lab Units 10/30/19  2205   GLUCOSE RANDOM mg/dL 121     Results from last 7 days   Lab Units 10/30/19  2205   LIPASE u/L 76     Results from last 7 days   Lab Units 10/31/19  0030   CLARITY UA  Clear   COLOR UA  Yellow   SPEC GRAV UA  1 010   PH UA  7 0   GLUCOSE UA mg/dl Negative   KETONES UA mg/dl 15 (1+)*   BLOOD UA  Negative   PROTEIN UA mg/dl Negative   NITRITE UA  Negative   BILIRUBIN UA  Negative   UROBILINOGEN UA E U /dl 0 2   LEUKOCYTES UA  Negative     ED Treatment:   Medication Administration from 10/30/2019 2129 to 10/31/2019 1159       Date/Time Order Dose Route Action Comments     10/30/2019 2203 sodium chloride 0 9 % bolus 1,000 mL 1,000 mL Intravenous New Bag      10/30/2019 2202 ondansetron (ZOFRAN) injection 4 mg 4 mg Intravenous Given      10/30/2019 2254 ketorolac (TORADOL) injection 15 mg 15 mg Intravenous Given      10/30/2019 2202 fentanyl citrate (PF) 100 MCG/2ML 75 mcg 75 mcg Intravenous Given      10/31/2019 0930 enoxaparin (LOVENOX) subcutaneous injection 40 mg 40 mg Subcutaneous Given      10/31/2019 1042 sodium chloride 0 9 % infusion 125 mL/hr Intravenous New Bag      10/31/2019 0335 sodium chloride 0 9 % infusion 125 mL/hr Intravenous New Bag      10/31/2019 1035 metroNIDAZOLE (FLAGYL) IVPB (premix) 500 mg 500 mg Intravenous New Bag      10/31/2019 0137 metroNIDAZOLE (FLAGYL) IVPB (premix) 500 mg 500 mg Intravenous New Bag      10/31/2019 0934 ceFAZolin (ANCEF) IVPB (premix) 2,000 mg 2,000 mg Intravenous New Bag      10/31/2019 0137 ceFAZolin (ANCEF) IVPB (premix) 2,000 mg 2,000 mg Intravenous New Bag      10/31/2019 0940 HYDROmorphone (DILAUDID) injection 1 mg 1 mg Intravenous Given      10/31/2019 2782 HYDROmorphone (DILAUDID) injection 1 mg 1 mg Intravenous Given      10/31/2019 0140 HYDROmorphone (DILAUDID) injection 1 mg 1 mg Intravenous Given         Past Medical History:   Diagnosis Date    Asthma      Present on Admission:  **None**      Admitting Diagnosis: Abdominal pain [R10 9]  Acute appendicitis [K35 80]  Age/Sex: 38 y o  female  Admission Orders: 10/31/2019  0024 OUTPATIENT NO CHARGE BED and CHANGED 10/31/2019 1713 INPATIENT   Scheduled Medications:    Medications:  cefazolin 2,000 mg Intravenous Q8H   enoxaparin 40 mg Subcutaneous Daily   influenza vaccine 0 5 mL Intramuscular Once   metroNIDAZOLE 500 mg Intravenous Q8H     Continuous IV Infusions:    sodium chloride 125 mL/hr Intravenous Continuous     PRN Meds:    acetaminophen 650 mg Oral Q6H PRN   HYDROmorphone - used x 6 (0702, 0940, 1343, 1348, 1840, 0726) 1 mg Intravenous Q3H PRN   ketorolac 15 mg Intravenous Q6H PRN   Ondansetron - used x 2 (4202, 1385) 4 mg Intravenous Q6H PRN   oxyCODONE 10 mg Oral Q4H PRN   oxyCODONE 5 mg Oral Q4H PRN     Drain to bulb suction  Network Utilization Review Department  [email or web address]  ATTENTION: Please call with any questions or concerns to [phone] and carefully listen to the prompts so that you are directed to the right person   All voicemails are confidential   Ophelia Delarosa all requests for admission clinical reviews, approved or denied determinations and any other requests to dedicated fax number below belonging to the campus where the patient is receiving treatment    FACILITY NAME UR FAX NUMBER   ADMISSION DENIALS (Administrative/Medical Necessity) [address] [facility] (Maternity/NICU/Pediatrics) [phone]   [facility] [address] [facility] [address] [facility] [phone]   [address] [place] [address] [address] [facility] [phone]   Georgana Shells [address] [facility] [address] [place] [address] [phone]

## 2019-11-01 NOTE — PROGRESS NOTES
Progress Note - General Surgery   Devan Mott 38 y o  female MRN: [id number]  Unit/Bed#: S -01 Encounter: [id number]    Assessment:  Acute appendicitis, status post laparoscopic appendectomy    Plan:  Continue IV fluid, antibiotics and pain medications as ordered  Clear liquid diet  Antiemetic medication for nausea  Encourage ambulation  Incentive spirometry  DVT prophylaxis  Possible discharge home today or tomorrow    Subjective/Objective       Subjective:  History of acute appendicitis, status post laparoscopic appendectomy postop day 1  Acute appendicitis with perforation and abscess per OR report  Drain was placed during procedure  Patient is complaining of nausea this morning, difficulty urinating as well  She was given Zofran few minutes ago  No bowel movement or  flatus this morning  Patient denies any fever or chill, difficulty breathing  No major events overnight  JAYMIE drain output 30 mL overnight  Objective:     Blood pressure 107/60, pulse 80, temperature 97 7 °F (36 5 °C), temperature source Oral, resp  rate 16, height 5' 6" (1 676 m), weight 81 1 kg (178 lb 12 7 oz), last menstrual period 10/09/2019, SpO2 96 %  ,Body mass index is 28 86 kg/m²        Intake/Output Summary (Last 24 hours) at 11/1/2019 0810  Last data filed at 11/1/2019 0105  Gross per 24 hour   Intake 2866 67 ml   Output 60 ml   Net 2806 67 ml       Invasive Devices     Peripheral Intravenous Line            Peripheral IV 10/30/19 Right Antecubital 1 day          Drain            Closed/Suction Drain Other (Comment) RLQ Bulb 10 Fr  1 day                Physical Exam: General appearance: alert and oriented, in no acute distress  Lungs: clear to auscultation bilaterally  Heart: regular rate and rhythm, S1, S2 normal, no murmur, click, rub or gallop  Abdomen: Soft, tender to palpation lower mid abdomen, no guarding, functioning drain with serosanguineous fluid  Extremities: extremities normal, warm and well-perfused; no cyanosis, clubbing, or edema  Pulses: 2+ and symmetric  Neurologic: Grossly normal    Lab, Imaging and other studies:CBC: No results found for: WBC, HGB, HCT, MCV, PLT, ADJUSTEDWBC, MCH, MCHC, RDW, MPV, NRBC, CMP: No results found for: SODIUM, K, CL, CO2, ANIONGAP, BUN, CREATININE, GLUCOSE, CALCIUM, AST, ALT, ALKPHOS, PROT, BILITOT, EGFR  VTE Pharmacologic Prophylaxis: Enoxaparin (Lovenox)  VTE Mechanical Prophylaxis: sequential compression device

## 2019-11-01 NOTE — RAPID RESPONSE
Progress Note - Rapid Response   Osmany Melendez 38 y o  female MRN: [id number]    Time Called ( Time): 1113  Date Called: 11/01/2019  Level of Care: MS  Room#: 962  YCCQDGC Time ( Time): 5434  Event End Time ( Time): 6875  Primary reason for call: Other dizzy, near syncope, lowered herself to floor  Interventions:  Airway/Breathing:  No Intervention  Circulation: N/A  Other Treatments: N/A       Assessment:   1  Near syncope secondary to narcotic use    Plan:   · Decrease prn Dilaudid dose from 1 mg to 0 2 mg for breakthrough, will send lytes, cont fluids per Surgery team       HPI/Chief Complaint (Background/Situation):   Osmany Melendez is a 38y o  year old female who presents s/p appendectomy and was slotted to be d/c later today and she walked to bathroom and developed severe dizziness and near syncope so she sat herself on the ground  Found her seated next to her bed  Denies fall and offer no c/o pain  BP in the 140s and HR low 100's  Assisted to stand with 2 people and sat back in bed  She states she is feeling better in bed  Will check lytes and decrease narcotic dosing  Patient was narcotic naive prior to surgery      Historical Information   Past Medical History:   Diagnosis Date    Asthma      Past Surgical History:   Procedure Laterality Date    [place] LAP,APPENDECTOMY N/A 10/31/2019    Procedure: APPENDECTOMY LAPAROSCOPIC;  Surgeon: Mervin William MD;  Location: [facility];  Service: General     Social History   Social History     Substance and Sexual Activity   Alcohol Use Yes    Comment: Social     Social History     Substance and Sexual Activity   Drug Use Never     Social History     Tobacco Use   Smoking Status Never Smoker   Smokeless Tobacco Never Used     Family History: non-contributory    Meds/Allergies     Current Facility-Administered Medications:  acetaminophen 650 mg Oral Q6H PRN Ava Sonya Sanches    cefazolin 2,000 mg Intravenous Q8H [employer] Last Rate: 2,000 mg (11/01/19 0417)   dextrose 5 % and sodium chloride 0 9 % with KCl 40 mEq/L 100 mL/hr Intravenous Continuous Jose Salcido DO    enoxaparin 40 mg Subcutaneous Daily Noe Coca    HYDROmorphone 0 2 mg Intravenous Q3H PRN Kaitlin Isbell, CRETHEL    influenza vaccine 0 5 mL Intramuscular Once Mirella Marsh MD    ketorolac 15 mg Intravenous Q6H PRN Aldo Molina PA-C    metroNIDAZOLE 500 mg Intravenous Q8H Noe Coca Last Rate: 500 mg (11/01/19 0932)   ondansetron 4 mg Intravenous Q6H PRN Noe Coca    oxyCODONE 5 mg Oral Q4H PRN Kaitlin Isbell, NIKO    Or        oxyCODONE 10 mg Oral Q4H PRN Kaitlin Isbell, NIKO    prochlorperazine 5 mg Oral Q6H PRN Funmi Barba PA-C          dextrose 5 % and sodium chloride 0 9 % with KCl 40 mEq/L 100 mL/hr       No Known Allergies    ROS:  Review of Systems   Constitutional: Negative  HENT: Negative  Eyes: Negative  Respiratory: Negative  Cardiovascular: Negative  Gastrointestinal: Positive for abdominal pain (at surgery site)  Genitourinary: Negative  Musculoskeletal: Negative  Skin: Negative  Neurological: Positive for dizziness and light-headedness  Psychiatric/Behavioral: Negative  All other systems reviewed and are negative  Vitals: 98 5- 120- 24- 145/98- 100% RA    Physical Exam:  Physical Exam   Constitutional: She is oriented to person, place, and time  She appears well-developed and well-nourished  No distress  HENT:   Head: Normocephalic  Eyes: Pupils are equal, round, and reactive to light  Neck: Normal range of motion  Cardiovascular: Normal rate and regular rhythm  Pulmonary/Chest: Effort normal  No respiratory distress  Abdominal: Soft  Bowel sounds are normal  She exhibits no distension  There is tenderness (at surgical site)  Musculoskeletal: Normal range of motion  She exhibits no edema  Neurological: She is alert and oriented to person, place, and time  Skin: Skin is warm and dry  Capillary refill takes less than 2 seconds  She is not diaphoretic  Psychiatric: She has a normal mood and affect  Her behavior is normal          Intake/Output Summary (Last 24 hours) at 11/1/2019 1334  Last data filed at 11/1/2019 1333  Gross per 24 hour   Intake 3475 ml   Output 100 ml   Net 3375 ml       Respiratory    Lab Data (Last 4 hours)    None         O2/Vent Data (Last 4 hours)    None              Invasive Devices     Peripheral Intravenous Line            Peripheral IV 10/30/19 Right Antecubital 1 day          Drain            Closed/Suction Drain Other (Comment) RLQ Bulb 10 Fr  1 day                DIAGNOSTIC DATA:    Lab: I have personally reviewed pertinent lab results  CBC:   Results from last 7 days   Lab Units 10/30/19  2205   WBC Thousand/uL 15 80*   HEMOGLOBIN g/dL 14 0   HEMATOCRIT % 41 5   PLATELETS Thousands/uL 274     CMP:   Results from last 7 days   Lab Units 11/01/19  1121 10/30/19  2205   POTASSIUM mmol/L 3 2* 4 1   CHLORIDE mmol/L 103 101   CO2 mmol/L 24 21   BUN mg/dL 14 16   CREATININE mg/dL 0 85 0 86   CALCIUM mg/dL 7 6* 8 9   ALK PHOS U/L  --  76   ALT U/L  --  25   AST U/L  --  24     PT/INR:   No results found for: PT, INR,   Magnesium: No components found for: MAG,   Phosphorous: No results found for: PHOS    Microbiology:  No results found for: Dilan Furnace, SPUTUMCULTUR      OUTCOME:   Stayed in room   Family notified of transfer: yes  Family member contacted:  at bedside  Code Status: Level 1 - Full Code  Critical Care Time: Total Critical Care time spent 12 minutes excluding procedures, teaching and family updates

## 2019-11-02 ENCOUNTER — APPOINTMENT (INPATIENT)
Dept: RADIOLOGY | Facility: HOSPITAL | Age: 44
DRG: 340 | End: 2019-11-02
Payer: COMMERCIAL

## 2019-11-02 VITALS
RESPIRATION RATE: 18 BRPM | TEMPERATURE: 98.1 F | BODY MASS INDEX: 28.73 KG/M2 | HEART RATE: 92 BPM | OXYGEN SATURATION: 98 % | SYSTOLIC BLOOD PRESSURE: 122 MMHG | DIASTOLIC BLOOD PRESSURE: 69 MMHG | HEIGHT: 66 IN | WEIGHT: 178.79 LBS

## 2019-11-02 PROCEDURE — 71046 X-RAY EXAM CHEST 2 VIEWS: CPT

## 2019-11-02 RX ORDER — AMOXICILLIN AND CLAVULANATE POTASSIUM 875; 125 MG/1; MG/1
1 TABLET, FILM COATED ORAL EVERY 12 HOURS SCHEDULED
Qty: 10 TABLET | Refills: 0 | Status: SHIPPED | OUTPATIENT
Start: 2019-11-02 | End: 2019-11-07

## 2019-11-02 RX ADMIN — POTASSIUM CHLORIDE, DEXTROSE MONOHYDRATE AND SODIUM CHLORIDE 100 ML/HR: 300; 5; 900 INJECTION, SOLUTION INTRAVENOUS at 01:37

## 2019-11-02 RX ADMIN — METRONIDAZOLE 500 MG: 500 INJECTION, SOLUTION INTRAVENOUS at 09:43

## 2019-11-02 RX ADMIN — ACETAMINOPHEN 650 MG: 325 TABLET, FILM COATED ORAL at 07:52

## 2019-11-02 RX ADMIN — ACETAMINOPHEN 650 MG: 325 TABLET, FILM COATED ORAL at 01:45

## 2019-11-02 RX ADMIN — CEFAZOLIN SODIUM 2000 MG: 2 SOLUTION INTRAVENOUS at 04:04

## 2019-11-02 RX ADMIN — ENOXAPARIN SODIUM 40 MG: 40 INJECTION SUBCUTANEOUS at 09:43

## 2019-11-02 RX ADMIN — METRONIDAZOLE 500 MG: 500 INJECTION, SOLUTION INTRAVENOUS at 01:36

## 2019-11-02 NOTE — NURSING NOTE
Pt c/o sharp pain in her right lung during inhalation  Pt described it as feeling like a bubble  Surgery made aware via phone call  11/2 0807  No new orders at this time  Possible chest xray per surgery

## 2019-11-02 NOTE — PROGRESS NOTES
Progress Note - General Surgery   Trace Groves 38 y o  female MRN: [id number]  Unit/Bed#: S -01 Encounter: [id number]    Assessment:  38 y o  F w/     C/o R chest pain    Plan:  Obtain CXR  Diet as tolerated  Saline lock  Anticipate d/c drain and d/c home today  Complete outpatient abx course    Subjective/Objective   Chief Complaint:     Subjective:     Objective:     Blood pressure 122/69, pulse 92, temperature 98 1 °F (36 7 °C), temperature source Oral, resp  rate 18, height 5' 6" (1 676 m), weight 81 1 kg (178 lb 12 7 oz), last menstrual period 10/09/2019, SpO2 98 %  ,Body mass index is 28 86 kg/m²  Intake/Output Summary (Last 24 hours) at 11/2/2019 1640  Last data filed at 11/2/2019 6167  Gross per 24 hour   Intake 3390 ml   Output 105 ml   Net 3285 ml       Invasive Devices     Peripheral Intravenous Line            Peripheral IV 10/30/19 Right Antecubital 2 days          Drain            Closed/Suction Drain Other (Comment) RLQ Bulb 10 Fr  2 days                Physical Exam:   NAD  CV RRR  Pulm normal effort  Abd soft, NTND, dressings c/d/i, drain serous    Lab, Imaging and other studies:  I have personally reviewed pertinent lab results    , CBC: No results found for: WBC, HGB, HCT, MCV, PLT, ADJUSTEDWBC, MCH, MCHC, RDW, MPV, NRBC, CMP:   Lab Results   Component Value Date    SODIUM 135 (L) 11/01/2019    K 3 2 (L) 11/01/2019     11/01/2019    CO2 24 11/01/2019    BUN 14 11/01/2019    CREATININE 0 85 11/01/2019    CALCIUM 7 6 (L) 11/01/2019    EGFR 84 11/01/2019     VTE Pharmacologic Prophylaxis: Heparin  VTE Mechanical Prophylaxis: sequential compression device

## 2019-11-02 NOTE — DISCHARGE SUMMARY
Discharge Summary - Osmany Melendez 38 y o  female MRN: [id number]    Unit/Bed#: S -01 Encounter: [id number]    Admission Date:   Admission Orders (From admission, onward)     Ordered        10/31/19 1803  Inpatient Admission  Once         10/31/19 1713  Inpatient Admission  Once                     Admitting Diagnosis: Abdominal pain [R10 9]  Acute appendicitis [K35 80]    HPI:  43-year-old female who presented with acute onset abdominal pain to the emergency department  She was evaluated and found to have acute appendicitis  She was admitted to the general surgical service for further evaluation  Procedures Performed: No orders of the defined types were placed in this encounter  Procedure(s):  APPENDECTOMY LAPAROSCOPIC    Summary of Hospital Course: The patient's hospitalization was uncomplicated  She was admitted and underwent laparoscopic appendectomy on 10/31/2019 and was found have perforated appendicitis  She is admitted to the medical-surgical unit postop  Her recovery was uncomplicated  On postoperative day 2 she was tolerating regular diet and ambulating without assistance  Her drain was removed and she was deemed suitable for discharge to home with a 1 week course of antibiotics  She will follow up in the office 2 3 weeks  All questions were answered  Significant Findings, Care, Treatment and Services Provided:     Complications:  None    Discharge Diagnosis:  Acute perforated appendicitis        Condition at Discharge: good         Discharge instructions/Information to patient and family:   See after visit summary for information provided to patient and family  Provisions for Follow-Up Care:  See after visit summary for information related to follow-up care and any pertinent home health orders  PCP: Kurt Jimenez DO    Disposition: Home    Planned Readmission: No      Discharge Statement   I spent 30 minutes discharging the patient   This time was spent on the day of discharge  I had direct contact with the patient on the day of discharge  Additional documentation is required if more than 30 minutes were spent on discharge  Discharge Medications:  See after visit summary for reconciled discharge medications provided to patient and family

## 2019-11-02 NOTE — PLAN OF CARE
Problem: PAIN - ADULT  Goal: Verbalizes/displays adequate comfort level or baseline comfort level  Description  Interventions:  - Encourage patient to monitor pain and request assistance  - Assess pain using appropriate pain scale  - Administer analgesics based on type and severity of pain and evaluate response  - Implement non-pharmacological measures as appropriate and evaluate response  - Consider cultural and social influences on pain and pain management  - Notify physician/advanced practitioner if interventions unsuccessful or patient reports new pain  Outcome: Progressing     Problem: INFECTION - ADULT  Goal: Absence or prevention of progression during hospitalization  Description  INTERVENTIONS:  - Assess and monitor for signs and symptoms of infection  - Monitor lab/diagnostic results  - Monitor all insertion sites, i e  indwelling lines, tubes, and drains  - Monitor endotracheal if appropriate and nasal secretions for changes in amount and color  - [place] appropriate cooling/warming therapies per order  - Administer medications as ordered  - Instruct and encourage patient and family to use good hand hygiene technique  - Identify and instruct in appropriate isolation precautions for identified infection/condition  Outcome: Progressing  Goal: Absence of fever/infection during neutropenic period  Description  INTERVENTIONS:  - Monitor WBC    Outcome: Progressing     Problem: SAFETY ADULT  Goal: Patient will remain free of falls  Description  INTERVENTIONS:  - Assess patient frequently for physical needs  -  Identify cognitive and physical deficits and behaviors that affect risk of falls    -  [place] fall precautions as indicated by assessment   - Educate patient/family on patient safety including physical limitations  - Instruct patient to call for assistance with activity based on assessment  - Modify environment to reduce risk of injury  - Consider OT/PT consult to assist with strengthening/mobility  Outcome: Progressing  Goal: Maintain or return to baseline ADL function  Description  INTERVENTIONS:  -  Assess patient's ability to carry out ADLs; assess patient's baseline for ADL function and identify physical deficits which impact ability to perform ADLs (bathing, care of mouth/teeth, toileting, grooming, dressing, etc )  - Assess/evaluate cause of self-care deficits   - Assess range of motion  - Assess patient's mobility; develop plan if impaired  - Assess patient's need for assistive devices and provide as appropriate  - Encourage maximum independence but intervene and supervise when necessary  - Involve family in performance of ADLs  - Assess for home care needs following discharge   - Consider OT consult to assist with ADL evaluation and planning for discharge  - Provide patient education as appropriate  Outcome: Progressing  Goal: Maintain or return mobility status to optimal level  Description  INTERVENTIONS:  - Assess patient's baseline mobility status (ambulation, transfers, stairs, etc )    - Identify cognitive and physical deficits and behaviors that affect mobility  - Identify mobility aids required to assist with transfers and/or ambulation (gait belt, sit-to-stand, lift, walker, cane, etc )  - [place] fall precautions as indicated by assessment  - Record patient progress and toleration of activity level on Mobility SBAR; progress patient to next Phase/Stage  - Instruct patient to call for assistance with activity based on assessment  - Consider rehabilitation consult to assist with strengthening/weightbearing, etc   Outcome: Progressing     Problem: Knowledge Deficit  Goal: Patient/family/caregiver demonstrates understanding of disease process, treatment plan, medications, and discharge instructions  Description  Complete learning assessment and assess knowledge base    Interventions:  - Provide teaching at level of understanding  - Provide teaching via preferred learning methods  Outcome: Progressing

## 2019-11-18 PROBLEM — K35.80 ACUTE APPENDICITIS: Status: RESOLVED | Noted: 2019-10-30 | Resolved: 2019-11-18

## 2021-12-10 ENCOUNTER — IMMUNIZATIONS (OUTPATIENT)
Dept: FAMILY MEDICINE CLINIC | Facility: HOSPITAL | Age: 46
End: 2021-12-10

## 2021-12-10 DIAGNOSIS — Z23 ENCOUNTER FOR IMMUNIZATION: Primary | ICD-10-CM

## 2021-12-10 PROCEDURE — 0064A COVID-19 MODERNA VACC 0.25 ML BOOSTER: CPT

## 2021-12-10 PROCEDURE — 91306 COVID-19 MODERNA VACC 0.25 ML BOOSTER: CPT

## 2022-07-08 ENCOUNTER — COSMETIC (OUTPATIENT)
Dept: PLASTIC SURGERY | Facility: CLINIC | Age: 47
End: 2022-07-08

## 2022-07-08 VITALS
SYSTOLIC BLOOD PRESSURE: 112 MMHG | HEART RATE: 91 BPM | DIASTOLIC BLOOD PRESSURE: 70 MMHG | HEIGHT: 66 IN | WEIGHT: 132.8 LBS | TEMPERATURE: 98.8 F | BODY MASS INDEX: 21.34 KG/M2

## 2022-07-08 DIAGNOSIS — Z41.1 ENCOUNTER FOR COSMETIC SURGERY: Primary | ICD-10-CM

## 2022-07-08 PROCEDURE — COSCON COSMETIC CONSULTATION: Performed by: STUDENT IN AN ORGANIZED HEALTH CARE EDUCATION/TRAINING PROGRAM

## 2022-07-08 PROCEDURE — COSCON: Performed by: STUDENT IN AN ORGANIZED HEALTH CARE EDUCATION/TRAINING PROGRAM

## 2022-07-10 NOTE — PROGRESS NOTES
Plastic Surgery Consult    Reason for visit: interested in breast augmentation      HPI:  Patient is a pleasant 39 y/o female who is interested in breast augmentation  Patient weight is stable at 130 lbs for at least the last 6 month  She is done having children, underwent hysterectomy June 2021  She is unhappy with the size of her breasts, currently wears an A cup and feels that she has too much  Patient states that before her children and weight loss, she was a full C cup  Her goal size is Full B or small C cup  She would like a more natural look  She is otherwise healthy      Last mammogram within a year: normal    ROS: 12 pt ROS negative, except as otherwise noted in HPI    PMH: asthma  FamHx: non-contrib  SurgHx: vein stripping, appendectomy, microvascular decompression of left facial nerve  SocHx: no tobacco, +social ETOH  Meds: buspar, no blood thinners  Allergies: venlafaxine    PE:  Vitals:    07/08/22 1440   BP: 112/70   Pulse: 91   Temp: 98 8 °F (37 1 °C)       General: NC/AT, breathing comfortably on RA  Neuro: CN II-XII grossly intact, symmetric reflexes  HEENT: PERRLA, EOMI, external ears normal, no lesions or deformities, neck supple, trachea midline  Respiratory: CTAB, normal respiratory effort  Cardio: RRR, normal S1, S2, no murmur, rubs, gallops  GI: soft, non-tender, non-distended  Extremities/MSK: normal alignment, mobility, gait, no edema  Skin: no rashes, lesions, subcutaneous nodules    BMI: 21 4    Breast exam:  Bilateral small deflated breasts with bilateral grade I ptosis with mild pseudoptosis  No masses, skin dimpling, or nipple discharge  Normal nipple sensation  <2 cm superior skin pinch    Breast Measurements:    R  L  SN-N  21 cm  21 cm  N-IMF stretch  9 cm  9 cm  BW  12 cm  11 5 cm    A/P: 39 y/o female with hypomastia who is good candidate for breast augmentation   -Discussed risks, benefits, complications including BII, ALCL, implant rupture/leak, cap con, infection, bleeding, seroma, scarring, animation deformity due to need for submuscular pocket, need for further surgery in future   -I specifically discussed possible need for mastopexy in future  She has bilateral grade I ptosis and can perform mastopexy simultaneously, but patient may be happy with more naturally ptotic breast without the need for further scarring  I discussed that mastopexy can always be performed in the future  Patient acknowledged   She would like to pursue only augmentation at this time   -Patient sized: Currently, will order 250-325 cc (goal 300 cc) full B, small C, silicone implants, mod to modplus profile, submuscular pocket  -Will email patient quote    Nicola Barrett MD   [address] [place] [address] and Reconstructive Surgery   [address], [facility], [address]   Office: [phone]

## (undated) DEVICE — TISSUE RETRIEVAL SYSTEM: Brand: INZII RETRIEVAL SYSTEM

## (undated) DEVICE — TROCAR APPPLE 5MM EXTENDED LENGTH

## (undated) DEVICE — NEEDLE 22 G X 1 1/2 SAFETY

## (undated) DEVICE — GLOVE SRG BIOGEL ECLIPSE 7

## (undated) DEVICE — UNDYED BRAIDED (POLYGLACTIN 910), SYNTHETIC ABSORBABLE SUTURE: Brand: COATED VICRYL

## (undated) DEVICE — SUT MONOCRYL 4-0 PS-2 27 IN Y426H

## (undated) DEVICE — HARMONIC 1100 SHEARS, 36CM SHAFT LENGTH: Brand: HARMONIC

## (undated) DEVICE — DRAPE EQUIPMENT RF WAND

## (undated) DEVICE — STERILE SURGICAL LUBRICANT,  TUBE: Brand: SURGILUBE

## (undated) DEVICE — TROCAR: Brand: KII FIOS FIRST ENTRY

## (undated) DEVICE — ALLENTOWN LAP CHOLE APP PACK: Brand: CARDINAL HEALTH

## (undated) DEVICE — ETS45 RELOAD STANDARD 45MM: Brand: ENDOPATH

## (undated) DEVICE — ADHESIVE SKIN HIGH VISCOSITY EXOFIN 1ML

## (undated) DEVICE — INTENDED FOR TISSUE SEPARATION, AND OTHER PROCEDURES THAT REQUIRE A SHARP SURGICAL BLADE TO PUNCTURE OR CUT.: Brand: BARD-PARKER SAFETY BLADES SIZE 11, STERILE

## (undated) DEVICE — VIAL DECANTER

## (undated) DEVICE — ENDOPATH ETS-FLEX45 ARTICULATING ENDOSCOPIC LINEAR CUTTER, NO RELOAD: Brand: ENDOPATH

## (undated) DEVICE — CHLORAPREP HI-LITE 26ML ORANGE

## (undated) DEVICE — LIGHT HANDLE COVER SLEEVE DISP BLUE STELLAR

## (undated) DEVICE — IRRIG ENDO FLO TUBING